# Patient Record
Sex: MALE | Race: WHITE | NOT HISPANIC OR LATINO | URBAN - METROPOLITAN AREA
[De-identification: names, ages, dates, MRNs, and addresses within clinical notes are randomized per-mention and may not be internally consistent; named-entity substitution may affect disease eponyms.]

---

## 2018-09-11 ENCOUNTER — COMMUNICATION - HEALTHEAST (OUTPATIENT)
Dept: FAMILY MEDICINE | Facility: CLINIC | Age: 28
End: 2018-09-11

## 2018-10-16 ENCOUNTER — OFFICE VISIT - HEALTHEAST (OUTPATIENT)
Dept: FAMILY MEDICINE | Facility: CLINIC | Age: 28
End: 2018-10-16

## 2018-10-16 ENCOUNTER — COMMUNICATION - HEALTHEAST (OUTPATIENT)
Dept: FAMILY MEDICINE | Facility: CLINIC | Age: 28
End: 2018-10-16

## 2018-10-16 DIAGNOSIS — Z00.00 ENCOUNTER FOR ROUTINE HISTORY AND PHYSICAL EXAM FOR MALE: ICD-10-CM

## 2018-10-16 LAB
ALBUMIN SERPL-MCNC: 4.4 G/DL (ref 3.5–5)
ALP SERPL-CCNC: 69 U/L (ref 45–120)
ALT SERPL W P-5'-P-CCNC: 18 U/L (ref 0–45)
ANION GAP SERPL CALCULATED.3IONS-SCNC: 12 MMOL/L (ref 5–18)
AST SERPL W P-5'-P-CCNC: 17 U/L (ref 0–40)
BILIRUB SERPL-MCNC: 1.6 MG/DL (ref 0–1)
BUN SERPL-MCNC: 18 MG/DL (ref 8–22)
CALCIUM SERPL-MCNC: 10.4 MG/DL (ref 8.5–10.5)
CHLORIDE BLD-SCNC: 104 MMOL/L (ref 98–107)
CHOLEST SERPL-MCNC: 213 MG/DL
CO2 SERPL-SCNC: 25 MMOL/L (ref 22–31)
CREAT SERPL-MCNC: 1.02 MG/DL (ref 0.7–1.3)
ERYTHROCYTE [DISTWIDTH] IN BLOOD BY AUTOMATED COUNT: 13 % (ref 11–14.5)
FASTING STATUS PATIENT QL REPORTED: ABNORMAL
GFR SERPL CREATININE-BSD FRML MDRD: >60 ML/MIN/1.73M2
GLUCOSE BLD-MCNC: 72 MG/DL (ref 70–125)
HCT VFR BLD AUTO: 44.9 % (ref 40–54)
HDLC SERPL-MCNC: 51 MG/DL
HGB BLD-MCNC: 15.7 G/DL (ref 14–18)
LDLC SERPL CALC-MCNC: 150 MG/DL
MCH RBC QN AUTO: 29.7 PG (ref 27–34)
MCHC RBC AUTO-ENTMCNC: 34.9 G/DL (ref 32–36)
MCV RBC AUTO: 85 FL (ref 80–100)
PLATELET # BLD AUTO: 193 THOU/UL (ref 140–440)
PMV BLD AUTO: 9.3 FL (ref 7–10)
POTASSIUM BLD-SCNC: 4.4 MMOL/L (ref 3.5–5)
PROT SERPL-MCNC: 7.9 G/DL (ref 6–8)
RBC # BLD AUTO: 5.28 MILL/UL (ref 4.4–6.2)
SODIUM SERPL-SCNC: 141 MMOL/L (ref 136–145)
TRIGL SERPL-MCNC: 62 MG/DL
WBC: 7.8 THOU/UL (ref 4–11)

## 2018-10-16 ASSESSMENT — MIFFLIN-ST. JEOR: SCORE: 1882.96

## 2018-10-29 ENCOUNTER — COMMUNICATION - HEALTHEAST (OUTPATIENT)
Dept: FAMILY MEDICINE | Facility: CLINIC | Age: 28
End: 2018-10-29

## 2019-09-26 ENCOUNTER — COMMUNICATION - HEALTHEAST (OUTPATIENT)
Dept: FAMILY MEDICINE | Facility: CLINIC | Age: 29
End: 2019-09-26

## 2019-09-26 DIAGNOSIS — Z13.220 LIPID SCREENING: ICD-10-CM

## 2019-09-26 DIAGNOSIS — Z00.00 ROUTINE GENERAL MEDICAL EXAMINATION AT A HEALTH CARE FACILITY: ICD-10-CM

## 2019-09-27 ENCOUNTER — COMMUNICATION - HEALTHEAST (OUTPATIENT)
Dept: FAMILY MEDICINE | Facility: CLINIC | Age: 29
End: 2019-09-27

## 2019-10-01 ENCOUNTER — COMMUNICATION - HEALTHEAST (OUTPATIENT)
Dept: GENERAL RADIOLOGY | Facility: CLINIC | Age: 29
End: 2019-10-01

## 2019-11-19 ENCOUNTER — AMBULATORY - HEALTHEAST (OUTPATIENT)
Dept: FAMILY MEDICINE | Facility: CLINIC | Age: 29
End: 2019-11-19

## 2019-11-19 ENCOUNTER — OFFICE VISIT - HEALTHEAST (OUTPATIENT)
Dept: FAMILY MEDICINE | Facility: CLINIC | Age: 29
End: 2019-11-19

## 2019-11-19 DIAGNOSIS — Z00.00 ROUTINE GENERAL MEDICAL EXAMINATION AT A HEALTH CARE FACILITY: ICD-10-CM

## 2019-11-19 DIAGNOSIS — Z13.220 LIPID SCREENING: ICD-10-CM

## 2019-11-19 LAB
ALBUMIN SERPL-MCNC: 4.3 G/DL (ref 3.5–5)
ALP SERPL-CCNC: 57 U/L (ref 45–120)
ALT SERPL W P-5'-P-CCNC: 27 U/L (ref 0–45)
ANION GAP SERPL CALCULATED.3IONS-SCNC: 9 MMOL/L (ref 5–18)
AST SERPL W P-5'-P-CCNC: 19 U/L (ref 0–40)
BILIRUB SERPL-MCNC: 1.5 MG/DL (ref 0–1)
BUN SERPL-MCNC: 16 MG/DL (ref 8–22)
CALCIUM SERPL-MCNC: 9.7 MG/DL (ref 8.5–10.5)
CHLORIDE BLD-SCNC: 107 MMOL/L (ref 98–107)
CHOLEST SERPL-MCNC: 188 MG/DL
CO2 SERPL-SCNC: 25 MMOL/L (ref 22–31)
CREAT SERPL-MCNC: 1.09 MG/DL (ref 0.7–1.3)
ERYTHROCYTE [DISTWIDTH] IN BLOOD BY AUTOMATED COUNT: 12.9 % (ref 11–14.5)
FASTING STATUS PATIENT QL REPORTED: NORMAL
GFR SERPL CREATININE-BSD FRML MDRD: >60 ML/MIN/1.73M2
GLUCOSE BLD-MCNC: 90 MG/DL (ref 70–125)
HCT VFR BLD AUTO: 45.8 % (ref 40–54)
HDLC SERPL-MCNC: 45 MG/DL
HGB BLD-MCNC: 15.7 G/DL (ref 14–18)
LDLC SERPL CALC-MCNC: 127 MG/DL
MCH RBC QN AUTO: 29.4 PG (ref 27–34)
MCHC RBC AUTO-ENTMCNC: 34.3 G/DL (ref 32–36)
MCV RBC AUTO: 86 FL (ref 80–100)
PLATELET # BLD AUTO: 174 THOU/UL (ref 140–440)
PMV BLD AUTO: 8.6 FL (ref 7–10)
POTASSIUM BLD-SCNC: 4.2 MMOL/L (ref 3.5–5)
PROT SERPL-MCNC: 7.3 G/DL (ref 6–8)
RBC # BLD AUTO: 5.34 MILL/UL (ref 4.4–6.2)
SODIUM SERPL-SCNC: 141 MMOL/L (ref 136–145)
TRIGL SERPL-MCNC: 80 MG/DL
WBC: 5 THOU/UL (ref 4–11)

## 2019-11-19 ASSESSMENT — MIFFLIN-ST. JEOR: SCORE: 1954.06

## 2020-09-15 ENCOUNTER — OFFICE VISIT - HEALTHEAST (OUTPATIENT)
Dept: FAMILY MEDICINE | Facility: CLINIC | Age: 30
End: 2020-09-15

## 2020-09-15 DIAGNOSIS — Z13.220 LIPID SCREENING: ICD-10-CM

## 2020-09-15 DIAGNOSIS — Z00.00 ROUTINE GENERAL MEDICAL EXAMINATION AT A HEALTH CARE FACILITY: ICD-10-CM

## 2020-09-15 LAB
ALBUMIN SERPL-MCNC: 4.1 G/DL (ref 3.5–5)
ALP SERPL-CCNC: 52 U/L (ref 45–120)
ALT SERPL W P-5'-P-CCNC: 16 U/L (ref 0–45)
ANION GAP SERPL CALCULATED.3IONS-SCNC: 8 MMOL/L (ref 5–18)
AST SERPL W P-5'-P-CCNC: 16 U/L (ref 0–40)
BILIRUB SERPL-MCNC: 1.4 MG/DL (ref 0–1)
BUN SERPL-MCNC: 17 MG/DL (ref 8–22)
CALCIUM SERPL-MCNC: 9.6 MG/DL (ref 8.5–10.5)
CHLORIDE BLD-SCNC: 105 MMOL/L (ref 98–107)
CHOLEST SERPL-MCNC: 191 MG/DL
CO2 SERPL-SCNC: 28 MMOL/L (ref 22–31)
CREAT SERPL-MCNC: 1.14 MG/DL (ref 0.7–1.3)
ERYTHROCYTE [DISTWIDTH] IN BLOOD BY AUTOMATED COUNT: 12.7 % (ref 11–14.5)
FASTING STATUS PATIENT QL REPORTED: YES
GFR SERPL CREATININE-BSD FRML MDRD: >60 ML/MIN/1.73M2
GLUCOSE BLD-MCNC: 86 MG/DL (ref 70–125)
HCT VFR BLD AUTO: 45 % (ref 40–54)
HDLC SERPL-MCNC: 54 MG/DL
HGB BLD-MCNC: 15.2 G/DL (ref 14–18)
LDLC SERPL CALC-MCNC: 126 MG/DL
MCH RBC QN AUTO: 28.9 PG (ref 27–34)
MCHC RBC AUTO-ENTMCNC: 33.7 G/DL (ref 32–36)
MCV RBC AUTO: 86 FL (ref 80–100)
PLATELET # BLD AUTO: 151 THOU/UL (ref 140–440)
PMV BLD AUTO: 9 FL (ref 7–10)
POTASSIUM BLD-SCNC: 4.2 MMOL/L (ref 3.5–5)
PROT SERPL-MCNC: 7.1 G/DL (ref 6–8)
RBC # BLD AUTO: 5.24 MILL/UL (ref 4.4–6.2)
SODIUM SERPL-SCNC: 141 MMOL/L (ref 136–145)
TRIGL SERPL-MCNC: 54 MG/DL
WBC: 4 THOU/UL (ref 4–11)

## 2020-09-15 RX ORDER — CETIRIZINE HYDROCHLORIDE 10 MG/1
10 TABLET ORAL DAILY
Status: SHIPPED | COMMUNITY
Start: 2020-09-15

## 2020-09-15 ASSESSMENT — MIFFLIN-ST. JEOR: SCORE: 1894.18

## 2021-06-01 NOTE — TELEPHONE ENCOUNTER
[facility] message sent to pt: Twin I know you need to get in for a physical, I will pend fasting labs if you want to get done ahead of time, but let me know your availability on mondays, tuesdays, and fridays and I will get you in sooner than December?   Please watch for a response to come back to schedule him sooner

## 2021-06-01 NOTE — TELEPHONE ENCOUNTER
We filled out Wellness forms for Josi, but they have forms for Twin also.  His last px was oct 2018.  This form needs to be filled out by 11-30-19.  He tried to schedule a physical, but first opening was in December.  Could he come in for just lab work and measurements?     Please advise.

## 2021-06-01 NOTE — TELEPHONE ENCOUNTER
Please call patient and schedule him for Tuesday, November 19 at 1 PM for annual physical.  I have labs pended if he wants to do ahead of time

## 2021-06-02 VITALS — HEIGHT: 72 IN | WEIGHT: 196.2 LBS | BODY MASS INDEX: 26.57 KG/M2

## 2021-06-03 VITALS
HEIGHT: 72 IN | SYSTOLIC BLOOD PRESSURE: 114 MMHG | RESPIRATION RATE: 22 BRPM | TEMPERATURE: 97.8 F | HEART RATE: 61 BPM | OXYGEN SATURATION: 94 % | DIASTOLIC BLOOD PRESSURE: 71 MMHG | WEIGHT: 211 LBS | BODY MASS INDEX: 28.58 KG/M2

## 2021-06-03 NOTE — PROGRESS NOTES
MALE ADULT PREVENTIVE EXAM    CHIEF COMPLAINT:  Male preventive exam.    HISTORY OF PRESENT ILLNESS:  Twin Sibley is a 29 y.o. male.  Presents for his annual physical.  He has no concerns at this time.  Has been exercising.  Does note that he gained weight in the summer and is just getting it back down again.  Had a little elevated cholesterol last year when I saw him.  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  They are not ready to have children yet until he is done with school most likely.  Hoping to work somewhere in the nearby area.  Has biometric forms to fill out today       He  has a past medical history of GERD (gastroesophageal reflux disease) (2008).    Lab Results   Component Value Date    WBC 5.0 11/19/2019    HGB 15.7 11/19/2019    HCT 45.8 11/19/2019    MCV 86 11/19/2019     11/19/2019     11/19/2019    K 4.2 11/19/2019    BUN 16 11/19/2019     Lab Results   Component Value Date    CHOL 188 11/19/2019    HDL 45 11/19/2019    LDLCALC 127 11/19/2019    TRIG 80 11/19/2019     No results found for: PSA  No results found for: TSH  BP Readings from Last 3 Encounters:   11/19/19 114/71   10/16/18 131/75       Surgeries:    Past Surgical History:   Procedure Laterality Date     NO PAST SURGERIES         Family History:  His family history includes Asthma in his brother; Cancer in his paternal grandfather; Hypertension in his mother.    Social History:  He  reports that he has never smoked. He has never used smokeless tobacco. He reports that he does not drink alcohol or use drugs.    Medications:  No current outpatient medications on file.  HELD MEDICATIONS: None.    Allergies:  No latex allergies.  Allergies   Allergen Reactions     Milk Nausea And Vomiting     Penicillins      Never had reaction - but 2 siblings have. So not true allergy        RISK BEHAVIORS AND HEALTH HABITS: Seat Belt Use: YES  Calcium intake/Osteoporosis prevention: YES  Guns: NO  Sun Screen:  YES  Dental Care: YES    REVIEW OF SYSTEMS:  Complete head to toe review of systems is otherwise negative except as above.    OBJECTIVE:  VITAL SIGNS:    Vitals:    11/19/19 1313   BP: 114/71   Pulse: 61   Resp: 22   Temp: 97.8  F (36.6  C)   SpO2: 94%     GENERAL:  Patient alert, in NAD  EYES: PERRLA. Extraoccular movements intact, pupils equal, reactive to light and accommodation.  Normal conjunctiva and lids.  Undilated fudoscopic exam normal, including normal size, appearance cup-to-disc ratio.  Normal posterior segments, including no exudates or hemorrhages.  ENT:  Hearing grossly normal.  Normal appearance to ears and nose.  Bilateral TM s, external canals, oropharynx normal. Normal lips, gums and teeth.  Normal nasal mucosa, septum and turbinates.  NECK:  Supple, without thyromegaly or mass.  RESP:  Clear to auscultation without crackles, wheezes or distress.  Normal respiratory effort.   BREASTS:  Nontender, without masses, nipple discharge, erythema, or axillary adenopathy.  CV:  Regular rate and rhythm without murmurs, rubs or gallops.  Normal carotid, abdominal aorta, femoral and pedal pulses.  No varicosities or edema.  ABDOMEN:  Soft, non-tender, without hepatosplenomegaly, masses, or hernias.  :   Deferred  LYMPHATIC: Normal palpation of neck, groin and axilla..  No lymphadenopathy.  No bruising.  NEURO:  CN II-XII intact, motor & sensory function all intact.  DTR and reflexes normal.  PSYCHIATRIC:  Alert & oriented with normal mood and affect.  Good judgment and insight.  SKIN:  Normal inspection and palpation.  MUSCULOSKELETAL: Normal gait and station.  - Spine / Ribs / Pelvis: Normal inspection, ROM, stability and strength: Spine, Head, Neck, Upper and Lower Extremities.    ASSESSMENT & PLAN  Twin was seen today for annual exam.    Diagnoses and all orders for this visit:    Routine general medical examination at a health care facility  -     Comprehensive Metabolic Panel  -     Lipid Cascade  -      HM2(CBC w/o Differential)    Lipid screening  -     Lipid Cascade    Normal male physical.  Labs ordered for routine maintenance.  Up-to-date on all immunizations.  Had flu shot already.  Will fill out biometric forms as soon as we have his cholesterol levels in       General  Immunizations reviewed and updated .  Alcohol use, safety and moderation discussed.  Recommended adequate calcium intake/osteoporosis prevention.  Discussed colon cancer screening at age 50, 45 if -American.  Diet and exercise reviewed, including goal of aerobic exercise 30-90 minutes most days of the week, moderation of portions sizes, avoiding eating out and fast food and increase in fruits and vegetables.  Discussed safe sex practices.  Discussed & recommended seat belt (& motorcycle helmet) use.  Discussed & recommended smoke detector.  Discussed sun protection.  Discussed weight management.

## 2021-06-04 VITALS
DIASTOLIC BLOOD PRESSURE: 67 MMHG | BODY MASS INDEX: 26.79 KG/M2 | TEMPERATURE: 97 F | SYSTOLIC BLOOD PRESSURE: 108 MMHG | HEIGHT: 72 IN | HEART RATE: 63 BPM | OXYGEN SATURATION: 98 % | WEIGHT: 197.8 LBS | RESPIRATION RATE: 20 BRPM

## 2021-06-11 NOTE — PROGRESS NOTES
MALE ADULT PREVENTIVE EXAM    CHIEF COMPLAINT:  Male preventive exam.    HISTORY OF PRESENT ILLNESS:  Twin Sibley is a 30 y.o. male.  Here for annual physical. Here with wife Josi    was supposed to graduate [employer] this month but got delayed because of COVID. Hoping to be done in March. Lost 14 lbs.  Weight goal  185lb.  Been healthy mostly this year. No concerns or changes in health. Curious about his cholesterol this year    He  has a past medical history of GERD (gastroesophageal reflux disease) (2008).    Lab Results   Component Value Date    WBC 4.0 09/15/2020    HGB 15.2 09/15/2020    HCT 45.0 09/15/2020    MCV 86 09/15/2020     09/15/2020     09/15/2020    K 4.2 09/15/2020    BUN 17 09/15/2020     Lab Results   Component Value Date    CHOL 191 09/15/2020    HDL 54 09/15/2020    LDLCALC 126 09/15/2020    TRIG 54 09/15/2020     No results found for: PSA  No results found for: TSH  BP Readings from Last 3 Encounters:   09/15/20 108/67   11/19/19 114/71   10/16/18 131/75       Surgeries:    Past Surgical History:   Procedure Laterality Date     NO PAST SURGERIES         Family History:  His family history includes Asthma in his brother; Cancer in his paternal grandfather; Hypertension in his mother.    Social History:  He  reports that he has never smoked. He has never used smokeless tobacco. He reports that he does not drink alcohol or use drugs.    Medications:    Current Outpatient Medications:      cetirizine (ZYRTEC) 10 MG tablet, Take 10 mg by mouth daily., Disp: , Rfl:   HELD MEDICATIONS: None.    Allergies:  No latex allergies.  Allergies   Allergen Reactions     Milk Nausea And Vomiting     Penicillins      Never had reaction - but 2 siblings have. So not true allergy        RISK BEHAVIORS AND HEALTH HABITS:    Seat Belt Use: YES  Calcium intake/Osteoporosis prevention: YES  Guns: YES but appropriately used   Sun Screen: YES  Dental Care: YES    REVIEW OF SYSTEMS:  Complete head  to toe review of systems is otherwise negative except as above.    OBJECTIVE:  VITAL SIGNS:    Vitals:    09/15/20 1415   BP: 108/67   Pulse: 63   Resp: 20   Temp: 97  F (36.1  C)   SpO2: 98%     GENERAL:  Patient alert, in NAD  EYES: PERRLA. Extraoccular movements intact, pupils equal, reactive to light and accommodation.  Normal conjunctiva and lids.    ENT:  Hearing grossly normal.  Normal appearance to ears and nose.  Bilateral TM s, external canals, oropharynx normal. Normal lips, gums and teeth.  Normal nasal mucosa, septum and turbinates.  NECK:  Supple, without thyromegaly or mass.  RESP:  Clear to auscultation without crackles, wheezes or distress.  Normal respiratory effort.   BREASTS:  Nontender, without masses, nipple discharge, erythema, or axillary adenopathy.  CV:  Regular rate and rhythm without murmurs, rubs or gallops.  Normal carotid, abdominal aorta, femoral and pedal pulses.  No varicosities or edema.  ABDOMEN:  Soft, non-tender, without hepatosplenomegaly, masses, or hernias.  :  Normal scrotum.  Penis circumcised without lesions or discharge.  LYMPHATIC: Normal palpation of neck, groin and axilla..  No lymphadenopathy.  No bruising.  NEURO:  CN II-XII intact, motor & sensory function all intact.  DTR and reflexes normal.  PSYCHIATRIC:  Alert & oriented with normal mood and affect.  Good judgment and insight.  SKIN:  Normal inspection and palpation.  MUSCULOSKELETAL: Normal gait and station.  - Spine / Ribs / Pelvis: Normal inspection, ROM, stability and strength: Spine, Head, Neck, Upper and Lower Extremities.   normal testicular exam. No hernia.     ASSESSMENT & PLAN  Twin was seen today for annual exam.    Diagnoses and all orders for this visit:    Routine general medical examination at a health care facility  -     Comprehensive Metabolic Panel  -     HM2(CBC w/o Differential)    Lipid screening  -     Lipid Cascade RANDOM    Normal physical exam.  Fasting labs ordered.  Received  influenza shot from his wife.    General  Immunizations reviewed and updated .  Alcohol use, safety and moderation discussed.  Recommended adequate calcium intake/osteoporosis prevention.  Discussed colon cancer screening at age 50, 45 if -American.  Diet and exercise reviewed, including goal of aerobic exercise 30-90 minutes most days of the week, moderation of portions sizes, avoiding eating out and fast food and increase in fruits and vegetables.  Discussed safe sex practices.  Discussed & recommended seat belt (& motorcycle helmet) use.  Discussed & recommended smoke detector.  Discussed sun protection.  Discussed weight management.

## 2021-06-16 NOTE — TELEPHONE ENCOUNTER
Telephone Encounter by Gloria Doran CMA at 10/1/2019  8:32 AM     Author: Gloria Doran CMA Service: -- Author Type: Certified Medical Assistant    Filed: 10/1/2019  8:32 AM Encounter Date: 9/26/2019 Status: Signed    : Gloria Doran CMA (Certified Medical Assistant)       Latia Lerma,            6:04 PM   Note      Please call patient and schedule him for Tuesday, November 19 at 1 PM for annual physical.  I have labs pended if he wants to do ahead of time

## 2021-06-20 ENCOUNTER — HEALTH MAINTENANCE LETTER (OUTPATIENT)
Age: 31
End: 2021-06-20

## 2021-06-21 NOTE — PROGRESS NOTES
MALE ADULT PREVENTIVE EXAM    CHIEF COMPLAINT:  Male preventive exam.    HISTORY OF PRESENT ILLNESS:  Twin Sibley is a 28 y.o. male.  Patient presents today to establish care.  His wife floor and goes to this clinic.  He has not seen a doctor in years.  He used to see: Dr. 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 [employer] .  Currently is a   Grandpa stomach cancer- moms side.   Mom -htn   Brother -asthma   Has 4 brothers.      He has no health concerns.  2 brothers had severe penicillin allergies but he has never had penicillin and not sure if he is allergic.  He is otherwise healthy.  Minor headaches here and there.  Stress is adequately controlled.  Been  for 3 years.  No health concerns no previous surgeries.  Hit his head when he was 9 years old and had to have stitches but otherwise no previous history of concussion.  Admits that he could exercise a little more.  The highest weight he has been up to his 225.  He  has no past medical history on file.    No results found for: WBC, HGB, HCT, MCV, PLT, NA, K, BUN  No results found for: CHOL, HDL, LDLCALC, TRIG  No results found for: PSA  No results found for: TSH  BP Readings from Last 3 Encounters:   10/16/18 131/75       Surgeries:    Past Surgical History:   Procedure Laterality Date     NO PAST SURGERIES         Family History:  His family history includes Asthma in his brother; Cancer in his paternal grandfather; Hypertension in his mother.    Social History:  He  reports that he has never smoked. He has never used smokeless tobacco. He reports that he does not drink alcohol or use illicit drugs.    Medications:  No current outpatient prescriptions on file.  HELD MEDICATIONS: None.    Allergies:  No latex allergies.  Allergies   Allergen Reactions     Milk Nausea And Vomiting      Penicillins      Never had reaction - but 2 siblings have. So not true allergy        RISK BEHAVIORS AND HEALTH HABITS:  PSA: The natural history of prostate cancer and ongoing controversy regarding screening and potential treatment outcomes of prostate cancer has been discussed with the patient. The meaning of a false positive PSA and a false negative PSA has been discussed. He indicates understanding of the limitations of this screening test.  Seat Belt Use: YES  Calcium intake/Osteoporosis prevention: YES  Guns: NO  Sun Screen: YES  Dental Care: YES    REVIEW OF SYSTEMS:  Complete head to toe review of systems is otherwise negative except as above.    OBJECTIVE:  VITAL SIGNS:    Vitals:    10/16/18 1504   BP: 131/75   Pulse: 67   Resp: 16   Temp: 98.4  F (36.9  C)     GENERAL:  Patient alert, in NAD  EYES: PERRLA. Extraoccular movements intact, pupils equal, reactive to light and accommodation.  Normal conjunctiva and lids.  Undilated fudoscopic exam normal, including normal size, appearance cup-to-disc ratio.  Normal posterior segments, including no exudates or hemorrhages.  ENT:  Hearing grossly normal.  Normal appearance to ears and nose.  Bilateral TM s, external canals, oropharynx normal. Normal lips, gums and teeth.  Normal nasal mucosa, septum and turbinates.  NECK:  Supple, without thyromegaly or mass.  RESP:  Clear to auscultation without crackles, wheezes or distress.  Normal respiratory effort.   BREASTS:  Nontender, without masses, nipple discharge, erythema, or axillary adenopathy.  CV:  Regular rate and rhythm without murmurs, rubs or gallops.  Normal carotid, abdominal aorta, femoral and pedal pulses.  No varicosities or edema.  ABDOMEN:  Soft, non-tender, without hepatosplenomegaly, masses, or hernias.  :  Normal scrotum.  Penis circumcised without lesions or discharge.  LYMPHATIC: Normal palpation of neck, groin and axilla..  No lymphadenopathy.  No bruising.  NEURO:  CN II-XII intact, motor &  sensory function all intact.  DTR and reflexes normal.  PSYCHIATRIC:  Alert & oriented with normal mood and affect.  Good judgment and insight.  SKIN:  Normal inspection and palpation.  MUSCULOSKELETAL: Normal gait and station.  - Spine / Ribs / Pelvis: Normal inspection, ROM, stability and strength: Spine, Head, Neck, Upper and Lower Extremities.    ASSESSMENT & PLAN  Twin was seen today for establish care and annual exam.    Diagnoses and all orders for this visit:    Encounter for routine history and physical exam for male  -     Lipid Cascade  -     Comprehensive Metabolic Panel  -     HM2(CBC w/o Differential)    Normal male physical.  No concerns at this time.  Routine labs ordered.  Patient up-to-date with his flu shot which he recently received on 8/26/2018.  Follow-up annually as needed.  General  Immunizations reviewed and updated .  Alcohol use, safety and moderation discussed.  Recommended adequate calcium intake/osteoporosis prevention.  Discussed colon cancer screening at age 50, 45 if -American.  Diet and exercise reviewed, including goal of aerobic exercise 30-90 minutes most days of the week, moderation of portions sizes, avoiding eating out and fast food and increase in fruits and vegetables.  Discussed safe sex practices.  Discussed & recommended seat belt (& motorcycle helmet) use.  Discussed & recommended smoke detector.  Discussed sun protection.  Discussed weight management.

## 2021-07-01 ENCOUNTER — RECORDS - HEALTHEAST (OUTPATIENT)
Dept: ADMINISTRATIVE | Facility: OTHER | Age: 31
End: 2021-07-01

## 2021-07-06 ENCOUNTER — COMMUNICATION - HEALTHEAST (OUTPATIENT)
Dept: FAMILY MEDICINE | Facility: CLINIC | Age: 31
End: 2021-07-06

## 2021-10-11 ENCOUNTER — HEALTH MAINTENANCE LETTER (OUTPATIENT)
Age: 31
End: 2021-10-11

## 2022-09-25 ENCOUNTER — HEALTH MAINTENANCE LETTER (OUTPATIENT)
Age: 32
End: 2022-09-25

## 2023-10-14 ENCOUNTER — HEALTH MAINTENANCE LETTER (OUTPATIENT)
Age: 33
End: 2023-10-14